# Patient Record
Sex: FEMALE | Race: WHITE | URBAN - METROPOLITAN AREA
[De-identification: names, ages, dates, MRNs, and addresses within clinical notes are randomized per-mention and may not be internally consistent; named-entity substitution may affect disease eponyms.]

---

## 2024-07-01 ENCOUNTER — OFFICE VISIT (OUTPATIENT)
Age: 19
End: 2024-07-01

## 2024-07-01 VITALS
WEIGHT: 135 LBS | BODY MASS INDEX: 20.46 KG/M2 | HEART RATE: 101 BPM | SYSTOLIC BLOOD PRESSURE: 116 MMHG | RESPIRATION RATE: 18 BRPM | TEMPERATURE: 100 F | HEIGHT: 68 IN | DIASTOLIC BLOOD PRESSURE: 79 MMHG | OXYGEN SATURATION: 98 %

## 2024-07-01 DIAGNOSIS — J03.90 ACUTE TONSILLITIS, UNSPECIFIED ETIOLOGY: Primary | ICD-10-CM

## 2024-07-01 LAB — STREPTOCOCCUS A RNA: NORMAL

## 2024-07-01 RX ORDER — AMOXICILLIN 500 MG/1
500 CAPSULE ORAL 2 TIMES DAILY
Qty: 20 CAPSULE | Refills: 0 | Status: SHIPPED | OUTPATIENT
Start: 2024-07-01 | End: 2024-07-11

## 2024-07-01 RX ORDER — DROSPIRENONE AND ETHINYL ESTRADIOL 0.02-3(28)
KIT ORAL
COMMUNITY
Start: 2024-05-30

## 2024-07-01 ASSESSMENT — ENCOUNTER SYMPTOMS
ABDOMINAL PAIN: 0
NAUSEA: 0
VOMITING: 0
SORE THROAT: 1
COUGH: 0

## 2024-07-01 NOTE — PROGRESS NOTES
Patricia Cassidy (:  2005) is a 19 y.o. female,New patient, here for evaluation of the following chief complaint(s):  Pharyngitis (Last week sore throat , hurts to swallow )      ASSESSMENT/PLAN:  Visit Diagnoses and Associated Orders       Acute tonsillitis, unspecified etiology    -  Primary    POCT Rapid Strep A DNA [36377 Custom]      Culture, Throat [48714 Custom]      amoxicillin (AMOXIL) 500 MG capsule [451]           ORDERS WITHOUT AN ASSOCIATED DIAGNOSIS    drospirenone-ethinyl estradiol (SHIRA) 3-0.02 MG per tablet [66624]             Rapid strep is NEG.  Due to symptoms, signs on exam, will begin course of antibiotic and send out throat culture.  Continue to do conservative care for sore throat including ibuprofen, warm salt water gargles, lozenges and hydration.  Will call with culture results once received.  Return if symptoms persist or change.  Proceed to hospital for evaluation if any worsening symptoms or concerns.      SUBJECTIVE/OBJECTIVE:      History provided by:  Patient   used: No    Pharyngitis  Severity:  Moderate  Duration:  5 days  Timing:  Constant  Progression:  Unchanged  Chronicity:  New  Associated symptoms: ear pain, fever and sore throat    Associated symptoms: no abdominal pain, no congestion, no cough, no fatigue, no nausea, no rash and no vomiting        ROS: See HPI       Vitals:    24 1757   BP: 116/79   Site: Left Upper Arm   Position: Sitting   Cuff Size: Medium Adult   Pulse: (!) 101   Resp: 18   Temp: 100 °F (37.8 °C)   SpO2: 98%   Weight: 61.2 kg (135 lb)   Height: 1.727 m (5' 8\")       Results for POC orders placed in visit on 24   POCT Rapid Strep A DNA   Result Value Ref Range    Streptococcus A RNA neg         Physical Exam  Vitals and nursing note reviewed.   Constitutional:       General: She is not in acute distress.     Appearance: Normal appearance. She is not diaphoretic.   HENT:      Right Ear: Tympanic membrane, ear canal

## 2024-07-01 NOTE — PATIENT INSTRUCTIONS
Rapid strep is NEG.  Due to symptoms, signs on exam, will begin course of antibiotic and send out throat culture.  Continue to do conservative care for sore throat including ibuprofen, warm salt water gargles, lozenges and hydration.  Will call with culture results once received.  Return if symptoms persist or change.  Proceed to hospital for evaluation if any worsening symptoms or concerns.

## 2024-07-03 LAB — BACTERIA THROAT AEROBE CULT: NORMAL

## 2024-07-04 ENCOUNTER — TELEPHONE (OUTPATIENT)
Age: 19
End: 2024-07-04

## 2024-07-04 NOTE — TELEPHONE ENCOUNTER
Pt parent Judith return call back message to discuss throat culture.  Rapid strep was negative at initial visit with signs that were consistent with strep or bacterial infection.  Started on antibiotic and culture ordered.  Culture result is no strep and normal sharri.  Parent states the Pt is still having throat pain and no improvement with treatment.  Taking rotation of Tylenol and Ibuprofen.  We discussed options of re-evaluation, steroid course for pain and swelling, returning for monospot at 10 days, stopping or continuing antibiotic.  Parent will discuss with Pt and decide preferred course.  Does not have PCP to f/u with.  Encouraged to Hospital if worsening symptoms for further evaluation.  No further questions.